# Patient Record
Sex: FEMALE | Race: WHITE | NOT HISPANIC OR LATINO | Employment: OTHER | URBAN - METROPOLITAN AREA
[De-identification: names, ages, dates, MRNs, and addresses within clinical notes are randomized per-mention and may not be internally consistent; named-entity substitution may affect disease eponyms.]

---

## 2023-12-20 DIAGNOSIS — R06.83 SNORING: Primary | ICD-10-CM

## 2024-05-17 ENCOUNTER — OFFICE VISIT (OUTPATIENT)
Dept: SLEEP MEDICINE | Facility: CLINIC | Age: 64
End: 2024-05-17
Payer: COMMERCIAL

## 2024-05-17 VITALS
WEIGHT: 145.2 LBS | BODY MASS INDEX: 25.73 KG/M2 | HEART RATE: 85 BPM | HEIGHT: 63 IN | SYSTOLIC BLOOD PRESSURE: 138 MMHG | DIASTOLIC BLOOD PRESSURE: 86 MMHG | OXYGEN SATURATION: 96 %

## 2024-05-17 DIAGNOSIS — M26.609 TMJ (TEMPOROMANDIBULAR JOINT DISORDER): ICD-10-CM

## 2024-05-17 DIAGNOSIS — R06.83 SNORING: Primary | ICD-10-CM

## 2024-05-17 DIAGNOSIS — H93.13 TINNITUS OF BOTH EARS: ICD-10-CM

## 2024-05-17 DIAGNOSIS — F41.9 ANXIETY: ICD-10-CM

## 2024-05-17 PROCEDURE — 99205 OFFICE O/P NEW HI 60 MIN: CPT | Performed by: PHYSICIAN ASSISTANT

## 2024-05-17 RX ORDER — CHOLECALCIFEROL (VITAMIN D3) 50 MCG
2000 TABLET ORAL
COMMUNITY

## 2024-05-17 RX ORDER — ATORVASTATIN CALCIUM 10 MG/1
10 TABLET, FILM COATED ORAL DAILY
COMMUNITY
Start: 2024-04-30

## 2024-05-17 RX ORDER — IPRATROPIUM BROMIDE 21 UG/1
SPRAY, METERED NASAL
COMMUNITY

## 2024-05-17 RX ORDER — BUSPIRONE HYDROCHLORIDE 5 MG/1
5 TABLET ORAL
COMMUNITY
Start: 2024-04-01 | End: 2025-04-01

## 2024-05-17 RX ORDER — LETROZOLE 2.5 MG/1
1 TABLET, FILM COATED ORAL DAILY
COMMUNITY
Start: 2024-04-29

## 2024-05-17 RX ORDER — SULINDAC 200 MG/1
1 TABLET ORAL 2 TIMES DAILY
COMMUNITY

## 2024-05-17 RX ORDER — ALBUTEROL SULFATE 90 UG/1
AEROSOL, METERED RESPIRATORY (INHALATION)
COMMUNITY
Start: 2022-12-02

## 2024-05-17 RX ORDER — CALCIUM CITRATE/MAGNESIUM/D3 250 MG-200
WAFER ORAL
COMMUNITY

## 2024-05-17 ASSESSMENT — SLEEP AND FATIGUE QUESTIONNAIRES
HOW LIKELY ARE YOU TO NOD OFF OR FALL ASLEEP WHEN YOU ARE A PASSENGER IN A CAR FOR AN HOUR WITHOUT A BREAK: SLIGHT CHANCE OF DOZING
HOW LIKELY ARE YOU TO NOD OFF OR FALL ASLEEP WHILE LYING DOWN TO REST IN THE AFTERNOON WHEN CIRCUMSTANCES PERMIT: SLIGHT CHANCE OF DOZING
HOW LIKELY ARE YOU TO NOD OFF OR FALL ASLEEP WHILE SITTING QUIETLY AFTER LUNCH WITHOUT ALCOHOL: WOULD NEVER DOZE
HOW LIKELY ARE YOU TO NOD OFF OR FALL ASLEEP IN A CAR, WHILE STOPPED FOR A FEW MINUTES IN TRAFFIC: WOULD NEVER DOZE
HOW LIKELY ARE YOU TO NOD OFF OR FALL ASLEEP WHILE WATCHING TV: SLIGHT CHANCE OF DOZING
HOW LIKELY ARE YOU TO NOD OFF OR FALL ASLEEP WHILE SITTING AND READING: WOULD NEVER DOZE
HOW LIKELY ARE YOU TO NOD OFF OR FALL ASLEEP WHILE SITTING INACTIVE IN A PUBLIC PLACE: WOULD NEVER DOZE
HOW LIKELY ARE YOU TO NOD OFF OR FALL ASLEEP WHILE SITTING AND TALKING TO SOMEONE: WOULD NEVER DOZE

## 2024-05-17 NOTE — NURSING NOTE
"Chief Complaint   Patient presents with    Sleep Problem     Was referred by [facility].        Initial /86   Pulse 85   Ht 1.61 m (5' 3.39\")   Wt 65.9 kg (145 lb 3.2 oz)   SpO2 96%   BMI 25.41 kg/m   Estimated body mass index is 25.41 kg/m  as calculated from the following:    Height as of this encounter: 1.61 m (5' 3.39\").    Weight as of this encounter: 65.9 kg (145 lb 3.2 oz).    Medication Reconciliation: complete    Neck circumference: 13 inches / 33 centimeters.    Issa Matias CMA on 5/17/2024 at 10:57 AM     "

## 2024-05-17 NOTE — PROGRESS NOTES
"Outpatient Sleep Medicine Consultation:    Name: Chayito Bryant MRN# [id number]   Age: 63 year old YOB: 1960     Date of Consultation: May 17, 2024  Consultation is requested by: Referred Self, MD  No address on file Referred Self  Primary care provider: No Ref-Primary, Physician       Reason for Sleep Consult:     Chayito Bryant is sent by Referred Self for a sleep consultation regarding 1. Snoring    Patient s Reason for visit  Chayito Bryant main reason for visit: tinnitus on left, pulsatile tinnitus on right, TMJ  Patient states problem(s) started: September 2022 pulsatile tinnitus, January 2023 regular tinnitus  Chayito Bryant's goals for this visit: Better sleep recommendations         Assessment and Plan:     Summary Sleep Diagnoses:  1. Snoring  Comorbid Diagnoses:  2. Tinnitus of both ears  3. TMJ (temporomandibular joint disorder)  4. Anxiety    Patient was referred by [facility] for evaluation of possible sleep apnea. Patient does snore but denies any other classic symptoms of sleep apnea such as gasping/choking arousals, witnessed apneic events, daytime sleepiness.  She does sleep alone however due to 's snoring/sleep apnea so no bed partner to comment on some symptoms.  We discussed the option of pursuing sleep study for evaluation but ultimately agreed not to pursue at this time given patient is not interested in any treatment even if testing were to be positive.  Both of her kids recently were diagnosed with EZEQUIEL and started on CPAP therapy, her kids are struggling with the mask and she does not think she would do well with it either and is not interested.  We discussed mandibular advancement device is something [facility] can make for her but she is not at all interested in that either - \"I know I absolutely would not wear it\". Since appears low risk for EZEQUIEL and not interested in any treatment no study at this time but she will let me know if " "she changes her mind or if symptoms progress.  Biggest sleep disruption at this time is due to tinnitus, but manageable.  Encouraged her to switch from watching TV to doing something non-screen related like listening to audiobook, a podcast, brown/pink/etc. noise since did not tolerate white noise to help drown out the tinnitus. She reports she has always been more of a night owl as well and we discussed not entering bed too early as that will prolong sleep latency. Discussed she could try some low-dose melatonin few hours before bed to help with her delayed phase too.  She will follow-up with sleep medicine as needed.         History of Present Illness:     Chayito Bryant is a 63-year-old female with allergic rhinitis, chronic pain, TMD, anxiety who was referred by Darshana Zhang at [facility] for evaluation of suspected sleep apnea.  Patient states \"my sleep problems are from my ears being loud at night it started in September 2022 when I got pulsatile tinnitus in my right ear and then in October I was diagnosed with breast cancer and had surgery in December and the ringing in the left ear started due to stress and sometimes it is like I have a big cicada in my ear but I have started watching TV now to go to bed and that helps\".  Tried white noise but did not feel helpful, plans to try other color noises.  [facility] gave her a splint but not a mandibular advancement device, she did not tolerate the splint very well.    SLEEP-WAKE SCHEDULE:     Work/School Days: Patient goes to school/work: No   Usually gets into bed at 10 pm  Takes patient up to 2 hours to fall asleep, watching TV during this time to drown out tinnitus  Has trouble falling asleep 7 nights per week  Wakes up in the middle of the night 2 times.  Wakes up due to Snorting self awake;Use the bathroom  She has trouble falling back asleep 3 times a week.   It usually takes up to 1 - 2 hours to get back to sleep  Patient is " usually up at 7 am  Uses alarm: No    Weekends/Non-work Days/All Other Days:  Usually gets into bed at same as above   Takes patient about same as above to fall asleep  Patient is usually up at same as above  Uses alarm: No    Sleep Need  Patient estimates she gets gets  5 - 6 hours sleep on average   Patient thinks she needs about 7 - 8 hours sleep    Chayito Bryant prefers to sleep in this position(s): Back;Head Elevated   Patient states they do the following activities in bed: Watch TV    Naps  Patient takes a purposeful nap 0 times a week   She dozes off unintentionally 0 days per week  Patient has had a driving accident or near-miss due to sleepiness/drowsiness: No  She had a total [place] Sleepiness Scale of 3 (05/17/24 1042), with scores of 10 or higher indicating abnormal levels of sleepiness.     SLEEP DISRUPTIONS:    Breathing/Snoring  Patient snores:Yes  Other people complain about her snoring: Yes  Patient has been told she stops breathing in her sleep:No   Gasp/choking arousals: No  She has issues with the following: Getting up to urinate more than once    Movement:  Patient gets pain, discomfort, with an urge to move:  No  Patient has been told she kicks her legs at night:  No     Behaviors in Sleep:  Denies sleepwalking, sleep talking, sleep eating, bruxism, dream enactment, recurring nightmares, night terrors, sleep paralysis, hypnagogic/hypnopompic hallucinations, cataplexy      Is there anything else you would like your sleep provider to know: Hypnic jerks middle of the night    CAFFEINE AND OTHER SUBSTANCES:    Patient consumes caffeinated beverages per day:  1  Last caffeine use is usually: 1 pm  List of any prescribed or over the counter stimulants that patient takes:  None  List of any prescribed or over the counter sleep medication patient takes:  None  List of previous sleep medications that patient has tried: melatonin  Patient drinks alcohol to help them sleep: No  Patient drinks alcohol  near bedtime: No    Family History:  Patient has a family member been diagnosed with a sleep disorder: Yes  son and daughter both with EZEQUIEL      SCALES:    EPWORTH SLEEPINESS SCALE         5/17/2024    10:42 AM    Fenton Sleepiness Scale ( [place]  [id number]<br>ESS - [place]/English - Final version - 21 Nov 07 - [facility].)   Sitting and reading Would never doze   Watching TV Slight chance of dozing   Sitting, inactive in a public place (e.g. a theatre or a meeting) Would never doze   As a passenger in a car for an hour without a break Slight chance of dozing   Lying down to rest in the afternoon when circumstances permit Slight chance of dozing   Sitting and talking to someone Would never doze   Sitting quietly after a lunch without alcohol Would never doze   In a car, while stopped for a few minutes in traffic Would never doze   Fenton Score (MC) 3   Fenton Score (Sleep) 3         INSOMNIA SEVERITY INDEX (AMITA)          5/17/2024    10:29 AM   Insomnia Severity Index (AMITA)   Difficulty falling asleep 3   Difficulty staying asleep 2   Problems waking up too early 2   How SATISFIED/DISSATISFIED are you with your CURRENT sleep pattern? 3   How NOTICEABLE to others do you think your sleep problem is in terms of impairing the quality of your life? 0   How WORRIED/DISTRESSED are you about your current sleep problem? 2   To what extent do you consider your sleep problem to INTERFERE with your daily functioning (e.g. daytime fatigue, mood, ability to function at work/daily chores, concentration, memory, mood, etc.) CURRENTLY? 2   AMITA Total Score 14       Guidelines for Scoring/Interpretation:  Total score categories:  0-7 = No clinically significant insomnia   8-14 = Subthreshold insomnia   15-21 = Clinical insomnia (moderate severity)  22-28 = Clinical insomnia (severe)  Used via courtesy of www.[employer].va.gov with permission from Fernando Carbajal PhD., [facility]      Allergies:    No Known  Allergies    Medications:    Current Outpatient Medications   Medication Sig Dispense Refill    albuterol (PROAIR HFA/PROVENTIL HFA/VENTOLIN HFA) 108 (90 Base) MCG/ACT inhaler TAKE 2 PUFFS BY MOUTH EVERY 6 HOURS AS NEEDED FOR WHEEZE      atorvastatin (LIPITOR) 10 MG tablet Take 10 mg by mouth daily      busPIRone (BUSPAR) 5 MG tablet Take 5 mg by mouth      Calcium-Magnesium-Vitamin D 250-125-200 MG-MG-UNIT WAFR Take by oral route.      ipratropium (ATROVENT) 0.03 % nasal spray 1-2 SPRAY EACH NOSTRIL EVERY DAY THREE TIMES DAILY AS NEEDED FOR NASAL SYMPTOMS      letrozole (FEMARA) 2.5 MG tablet Take 1 tablet by mouth daily      Multiple Vitamins-Minerals (MULTI COMPLETE/IRON PO) Take 1 tablet every day by oral route.      sulindac (CLINORIL) 200 MG tablet Take 1 tablet by mouth 2 times daily      Vitamin D3 50 mcg (2000 units) tablet Take 2,000 Units by mouth         Problem List:  There are no problems to display for this patient.       Past Medical/Surgical History:  No past medical history on file.  No past surgical history on file.    Social History:  Social History     Socioeconomic History    Marital status:      Spouse name: Not on file    Number of children: Not on file    Years of education: Not on file    Highest education level: Not on file   Occupational History    Not on file   Tobacco Use    Smoking status: Never    Smokeless tobacco: Never   Vaping Use    Vaping status: Never Used   Substance and Sexual Activity    Alcohol use: Not on file    Drug use: Not on file    Sexual activity: Not on file   Other Topics Concern    Not on file   Social History Narrative    Not on file     Social Determinants of Health     Financial Resource Strain: Not At Risk (4/1/2024)    Received from [employer]    Financial Resource Strain     Is it hard for you to pay for the very basics like food, housing, medical care or heating?: No   Food Insecurity: Not At Risk (4/1/2024)    Received from [employer]     "Food Insecurity     Does your food run out before you have the money to buy more?: No   Transportation Needs: Not At Risk (4/1/2024)    [facility]    Transportation Needs     Does a lack of transportation keep you from your medical appointments or from getting your medications?: No   Physical Activity: Not on file   Stress: Not on file   Social Connections: Not on file   Interpersonal Safety: Not on file   Housing Stability: Not on file       Family History:  No family history on file.    Review of Systems:  In the last TWO WEEKS have you experienced any of the following symptoms?  Fevers: No  Night Sweats: No  Weight Gain: No  Pain at Night: No  Double Vision: No  Changes in Vision: No  Difficulty Breathing through Nose: No  Sore Throat in Morning: No  Dry Mouth in the Morning: No  Shortness of Breath Lying Flat: No  Shortness of Breath With Activity: No  Awakening with Shortness of Breath: No  Increased Cough: No  Heart Racing at Night: No  Swelling in Feet or Legs: No  Diarrhea at Night: No  Heartburn at Night: No  Urinating More than Once at Night: Yes  Losing Control of Urine at Night: No  Joint Pains at Night: No  Headaches in Morning: No  Weakness in Arms or Legs: No  Depressed Mood: No  Anxiety: No     Physical Examination:  Vitals: /86   Pulse 85   Ht 1.61 m (5' 3.39\")   Wt 65.9 kg (145 lb 3.2 oz)   SpO2 96%   BMI 25.41 kg/m    BMI= Body mass index is 25.41 kg/m .  General appearance: Awake, alert, cooperative. Well groomed. Sitting comfortably in chair. In no apparent distress.  HEENT: Head: Normocephalic, atraumatic. Eyes: PERRL. Conjunctiva clear. Sclera normal. Nose: External appearance normal  Neck: Neck Cir (cm): 33 cm (5/17/2024 10:00 AM)  Skin: No rashes or significant lesions.   Neurologic: Alert, oriented x3. No focal neurological deficit. Gait normal.   Psychiatric: Mood euthymic. Affect congruent with full range and intensity.         Data: All pertinent previous " "laboratory data reviewed     No results for input(s): \"NA\", \"POTASSIUM\", \"CHLORIDE\", \"CO2\", \"ANIONGAP\", \"GLC\", \"BUN\", \"CR\", \"HENRIETTA\" in the last 14643 hours.    No results for input(s): \"WBC\", \"RBC\", \"HGB\", \"HCT\", \"MCV\", \"MCH\", \"MCHC\", \"RDW\", \"PLT\" in the last 19488 hours.    No results for input(s): \"PROTTOTAL\", \"ALBUMIN\", \"BILITOTAL\", \"ALKPHOS\", \"AST\", \"ALT\", \"BILIDIRECT\" in the last 74610 hours.    No results found for: \"TSH\"    No results found for: \"UAMP\", \"UBARB\", \"BENZODIAZEUR\", \"UCANN\", \"UCOC\", \"OPIT\", \"UPCP\"    No results found for: \"IRONSAT\", \"MA14729\", \"AUSTIN\"    No results found for: \"PH\", \"PHARTERIAL\", \"PO2\", \"[id number]\", \"SAT\", \"PCO2\", \"HCO3\", \"BASEEXCESS\", \"SEAN\", \"BEB\"    @LABRCNTIPR(phv:4,pco2v:4,po2v:4,hco3v:4,caitlin:4,o2per:4)@    Echocardiology: No results found for this or any previous visit (from the past 4320 hour(s)).    Chest x-ray: No results found for this or any previous visit from the past 365 days.      Chest CT: No results found for this or any previous visit from the past 365 days.      PFT: Most Recent [facility] Pulmonary Function Testing    No results found for: \"20001\"  No results found for: \"20002\"  No results found for: \"20003\"  No results found for: \"20015\"  No results found for: \"20016\"  No results found for: \"20027\"  No results found for: \"20028\"  No results found for: \"20029\"  No results found for: \"20079\"  No results found for: \"20080\"  No results found for: \"20081\"  No results found for: \"20335\"  No results found for: \"20105\"  No results found for: \"20053\"  No results found for: \"20054\"  No results found for: \"20055\"      Jessica Sheikh PA-C 5/17/2024     [facility] [place]  [address] [address]     [facility] [place]  [address], [place] [address]    Chart documentation was completed, in part, with [employer] voice-recognition software. Even though reviewed, some grammatical, spelling, and word " errors may remain.    60 minutes spent on day of encounter reviewing medical records, history and physical examination, review of previous testing and interpretation, documentation and further activities as noted above

## 2024-05-17 NOTE — PATIENT INSTRUCTIONS
Each of us has a built in tendency to find it easier to stay up late at night or get up early in the morning.  Being a  night owl  or  early bird  is a function of our internal body clock.  When you are forced to keep a sleep schedule that goes against your typical habits (because of a job or other responsibilities) insomnia can be the result.  Try the following changes to see if you can improve your sleep patterns:  Pick a sleep and wake schedule with about 7-8 hours in bed that is consistent.  That means keep the same bedtime and rise time every day of the week including the weekends, for the next 4-6 weeks, for example, go to bed at 11PM and get out of bed at 7AM  Try to get outside for about 30 minutes after you get up in the morning. Sunlight is the best way to  set  your internal body clock  or alternatively use of light therapy box/SAD lamp   Take melatonin - 1 - 3 mg about 4 hours before bedtime or about 7-8PM  Please keep a sleep log or diary during this time to track your sleep patterns

## 2024-12-10 ENCOUNTER — TRANSFERRED RECORDS (OUTPATIENT)
Dept: HEALTH INFORMATION MANAGEMENT | Facility: CLINIC | Age: 64
End: 2024-12-10
Payer: COMMERCIAL

## 2024-12-10 LAB — RETINOPATHY: NEGATIVE

## 2025-05-10 ENCOUNTER — HEALTH MAINTENANCE LETTER (OUTPATIENT)
Age: 65
End: 2025-05-10